# Patient Record
Sex: FEMALE | Race: WHITE | NOT HISPANIC OR LATINO
[De-identification: names, ages, dates, MRNs, and addresses within clinical notes are randomized per-mention and may not be internally consistent; named-entity substitution may affect disease eponyms.]

---

## 2019-02-28 ENCOUNTER — APPOINTMENT (OUTPATIENT)
Dept: OBGYN | Facility: CLINIC | Age: 69
End: 2019-02-28
Payer: MEDICARE

## 2019-02-28 VITALS
HEART RATE: 83 BPM | DIASTOLIC BLOOD PRESSURE: 86 MMHG | OXYGEN SATURATION: 98 % | BODY MASS INDEX: 38.89 KG/M2 | SYSTOLIC BLOOD PRESSURE: 134 MMHG | RESPIRATION RATE: 16 BRPM | HEIGHT: 66 IN | WEIGHT: 242 LBS

## 2019-02-28 DIAGNOSIS — N84.1 POLYP OF CERVIX UTERI: ICD-10-CM

## 2019-02-28 DIAGNOSIS — Z13.820 ENCOUNTER FOR SCREENING FOR OSTEOPOROSIS: ICD-10-CM

## 2019-02-28 DIAGNOSIS — Z12.31 ENCOUNTER FOR SCREENING MAMMOGRAM FOR MALIGNANT NEOPLASM OF BREAST: ICD-10-CM

## 2019-02-28 PROCEDURE — G0101: CPT

## 2019-02-28 NOTE — CHIEF COMPLAINT
[Annual Visit] : annual visit [FreeTextEntry1] : pap ,mammo ,dexa 2016\par colon 7 yrs ago\par daughter survivor 5 yrs lymphoma

## 2019-02-28 NOTE — PHYSICAL EXAM
[Awake] : awake [Alert] : alert [Acute Distress] : no acute distress [Mass] : no breast mass [Nipple Discharge] : no nipple discharge [Axillary LAD] : no axillary lymphadenopathy [Soft] : soft [Tender] : non tender [Oriented x3] : oriented to person, place, and time [Normal] : cervix [No Bleeding] : there was no active vaginal bleeding [Absent] : absent [Adnexa Absent] : absent bilaterally

## 2019-03-01 LAB — HPV HIGH+LOW RISK DNA PNL CVX: NOT DETECTED

## 2019-03-04 LAB — CYTOLOGY CVX/VAG DOC THIN PREP: NORMAL

## 2019-04-14 ENCOUNTER — FORM ENCOUNTER (OUTPATIENT)
Age: 69
End: 2019-04-14

## 2019-04-15 ENCOUNTER — APPOINTMENT (OUTPATIENT)
Dept: MAMMOGRAPHY | Facility: IMAGING CENTER | Age: 69
End: 2019-04-15
Payer: MEDICARE

## 2019-04-15 ENCOUNTER — OUTPATIENT (OUTPATIENT)
Dept: OUTPATIENT SERVICES | Facility: HOSPITAL | Age: 69
LOS: 1 days | End: 2019-04-15
Payer: MEDICARE

## 2019-04-15 ENCOUNTER — APPOINTMENT (OUTPATIENT)
Dept: RADIOLOGY | Facility: IMAGING CENTER | Age: 69
End: 2019-04-15
Payer: MEDICARE

## 2019-04-15 ENCOUNTER — APPOINTMENT (OUTPATIENT)
Dept: ULTRASOUND IMAGING | Facility: IMAGING CENTER | Age: 69
End: 2019-04-15
Payer: MEDICARE

## 2019-04-15 DIAGNOSIS — Z13.820 ENCOUNTER FOR SCREENING FOR OSTEOPOROSIS: ICD-10-CM

## 2019-04-15 DIAGNOSIS — Z12.31 ENCOUNTER FOR SCREENING MAMMOGRAM FOR MALIGNANT NEOPLASM OF BREAST: ICD-10-CM

## 2019-04-15 DIAGNOSIS — R92.2 INCONCLUSIVE MAMMOGRAM: ICD-10-CM

## 2019-04-15 DIAGNOSIS — Z90.710 ACQUIRED ABSENCE OF BOTH CERVIX AND UTERUS: Chronic | ICD-10-CM

## 2019-04-15 PROCEDURE — 77067 SCR MAMMO BI INCL CAD: CPT | Mod: 26

## 2019-04-15 PROCEDURE — 77080 DXA BONE DENSITY AXIAL: CPT | Mod: 26

## 2019-04-15 PROCEDURE — 77063 BREAST TOMOSYNTHESIS BI: CPT | Mod: 26

## 2019-04-15 PROCEDURE — 77080 DXA BONE DENSITY AXIAL: CPT

## 2019-04-15 PROCEDURE — 76641 ULTRASOUND BREAST COMPLETE: CPT

## 2019-04-15 PROCEDURE — 77063 BREAST TOMOSYNTHESIS BI: CPT

## 2019-04-15 PROCEDURE — 76641 ULTRASOUND BREAST COMPLETE: CPT | Mod: 26,50

## 2019-04-15 PROCEDURE — 77067 SCR MAMMO BI INCL CAD: CPT

## 2019-04-16 ENCOUNTER — OTHER (OUTPATIENT)
Age: 69
End: 2019-04-16

## 2019-04-21 ENCOUNTER — FORM ENCOUNTER (OUTPATIENT)
Age: 69
End: 2019-04-21

## 2019-04-22 ENCOUNTER — APPOINTMENT (OUTPATIENT)
Dept: MAMMOGRAPHY | Facility: IMAGING CENTER | Age: 69
End: 2019-04-22
Payer: MEDICARE

## 2019-04-22 ENCOUNTER — APPOINTMENT (OUTPATIENT)
Dept: ULTRASOUND IMAGING | Facility: IMAGING CENTER | Age: 69
End: 2019-04-22
Payer: MEDICARE

## 2019-04-22 ENCOUNTER — OUTPATIENT (OUTPATIENT)
Dept: OUTPATIENT SERVICES | Facility: HOSPITAL | Age: 69
LOS: 1 days | End: 2019-04-22
Payer: MEDICARE

## 2019-04-22 DIAGNOSIS — R92.2 INCONCLUSIVE MAMMOGRAM: ICD-10-CM

## 2019-04-22 DIAGNOSIS — Z90.710 ACQUIRED ABSENCE OF BOTH CERVIX AND UTERUS: Chronic | ICD-10-CM

## 2019-04-22 PROCEDURE — G0279: CPT | Mod: 26

## 2019-04-22 PROCEDURE — 76641 ULTRASOUND BREAST COMPLETE: CPT | Mod: 26,RT

## 2019-04-22 PROCEDURE — 77065 DX MAMMO INCL CAD UNI: CPT | Mod: 26,RT

## 2019-04-22 PROCEDURE — 76641 ULTRASOUND BREAST COMPLETE: CPT

## 2019-04-22 PROCEDURE — G0279: CPT

## 2019-04-22 PROCEDURE — 77065 DX MAMMO INCL CAD UNI: CPT

## 2019-09-05 ENCOUNTER — APPOINTMENT (OUTPATIENT)
Dept: GASTROENTEROLOGY | Facility: CLINIC | Age: 69
End: 2019-09-05
Payer: MEDICARE

## 2019-09-05 VITALS — BODY MASS INDEX: 37.77 KG/M2 | WEIGHT: 235 LBS | HEIGHT: 66 IN

## 2019-09-05 VITALS
OXYGEN SATURATION: 95 % | DIASTOLIC BLOOD PRESSURE: 80 MMHG | HEART RATE: 69 BPM | SYSTOLIC BLOOD PRESSURE: 130 MMHG | TEMPERATURE: 98.4 F

## 2019-09-05 DIAGNOSIS — Z87.42 PERSONAL HISTORY OF OTHER DISEASES OF THE FEMALE GENITAL TRACT: ICD-10-CM

## 2019-09-05 DIAGNOSIS — Z86.39 PERSONAL HISTORY OF OTHER ENDOCRINE, NUTRITIONAL AND METABOLIC DISEASE: ICD-10-CM

## 2019-09-05 DIAGNOSIS — Z78.9 OTHER SPECIFIED HEALTH STATUS: ICD-10-CM

## 2019-09-05 DIAGNOSIS — Z80.7 FAMILY HISTORY OF OTHER MALIGNANT NEOPLASMS OF LYMPHOID, HEMATOPOIETIC AND RELATED TISSUES: ICD-10-CM

## 2019-09-05 DIAGNOSIS — Z12.11 ENCOUNTER FOR SCREENING FOR MALIGNANT NEOPLASM OF COLON: ICD-10-CM

## 2019-09-05 DIAGNOSIS — Z80.1 FAMILY HISTORY OF MALIGNANT NEOPLASM OF TRACHEA, BRONCHUS AND LUNG: ICD-10-CM

## 2019-09-05 DIAGNOSIS — K59.00 CONSTIPATION, UNSPECIFIED: ICD-10-CM

## 2019-09-05 DIAGNOSIS — R19.4 CHANGE IN BOWEL HABIT: ICD-10-CM

## 2019-09-05 PROCEDURE — 99203 OFFICE O/P NEW LOW 30 MIN: CPT

## 2019-09-05 RX ORDER — FLUVASTATIN SODIUM 80 MG/1
80 TABLET, EXTENDED RELEASE ORAL
Refills: 0 | Status: ACTIVE | COMMUNITY

## 2019-09-08 PROBLEM — Z87.42 HISTORY OF ENDOMETRIOSIS: Status: RESOLVED | Noted: 2019-09-08 | Resolved: 2019-09-08

## 2019-09-08 NOTE — REASON FOR VISIT
[Initial Evaluation] : an initial evaluation [FreeTextEntry1] : Change in bowel habits, constipation, screening colonoscopy

## 2019-09-08 NOTE — ASSESSMENT
[FreeTextEntry1] : Patient with a recent change in her bowel habits with constipation.  Her last colonoscopy was over 10 years ago.\par \par A colonoscopy has been scheduled. The risks, benefits, alternatives, and limitations of the procedure, including the possibility of missed lesions, were explained.

## 2019-09-08 NOTE — HISTORY OF PRESENT ILLNESS
[FreeTextEntry1] : The patient is a 69-year-old woman who reports recent onset of constipation over the past 2 months.  She has to force/strain to move her bowels.  Prior to this, she was going once a day but now she skips days.  She denies melena or bright red blood per rectum.  She has mild right-sided abdominal pain that occurs while she is straining but then this resolves.  She denies heartburn or dysphasia.  She has lost 10 pounds over the past 6 months intentionally.  The patient has not been hospitalized in the past year and denies any cardiac issues.  The patient reports her last colonoscopy was over 10 years ago.

## 2019-09-08 NOTE — PHYSICAL EXAM
[General Appearance - Alert] : alert [General Appearance - In No Acute Distress] : in no acute distress [Neck Appearance] : the appearance of the neck was normal [Neck Cervical Mass (___cm)] : no neck mass was observed [Jugular Venous Distention Increased] : there was no jugular-venous distention [Thyroid Diffuse Enlargement] : the thyroid was not enlarged [Thyroid Nodule] : there were no palpable thyroid nodules [Auscultation Breath Sounds / Voice Sounds] : lungs were clear to auscultation bilaterally [Heart Rate And Rhythm] : heart rate was normal and rhythm regular [Heart Sounds] : normal S1 and S2 [Heart Sounds Gallop] : no gallops [Murmurs] : no murmurs [Heart Sounds Pericardial Friction Rub] : no pericardial rub [Edema] : there was no peripheral edema [Abdomen Soft] : soft [Bowel Sounds] : normal bowel sounds [Abdomen Tenderness] : non-tender [] : no hepato-splenomegaly [Abdomen Mass (___ Cm)] : no abdominal mass palpated [No CVA Tenderness] : no ~M costovertebral angle tenderness [No Spinal Tenderness] : no spinal tenderness [Impaired Insight] : insight and judgment were intact [Oriented To Time, Place, And Person] : oriented to person, place, and time [Affect] : the affect was normal [FreeTextEntry1] : obese

## 2019-10-04 ENCOUNTER — APPOINTMENT (OUTPATIENT)
Dept: GASTROENTEROLOGY | Facility: AMBULATORY MEDICAL SERVICES | Age: 69
End: 2019-10-04
Payer: MEDICARE

## 2019-10-04 PROCEDURE — 45385 COLONOSCOPY W/LESION REMOVAL: CPT

## 2019-10-28 ENCOUNTER — APPOINTMENT (OUTPATIENT)
Dept: GASTROENTEROLOGY | Facility: CLINIC | Age: 69
End: 2019-10-28
Payer: MEDICARE

## 2019-10-28 VITALS
HEIGHT: 66 IN | DIASTOLIC BLOOD PRESSURE: 80 MMHG | TEMPERATURE: 98.5 F | BODY MASS INDEX: 37.45 KG/M2 | SYSTOLIC BLOOD PRESSURE: 120 MMHG | OXYGEN SATURATION: 98 % | WEIGHT: 233 LBS | HEART RATE: 66 BPM

## 2019-10-28 DIAGNOSIS — K64.4 RESIDUAL HEMORRHOIDAL SKIN TAGS: ICD-10-CM

## 2019-10-28 DIAGNOSIS — K64.8 OTHER HEMORRHOIDS: ICD-10-CM

## 2019-10-28 PROCEDURE — 99213 OFFICE O/P EST LOW 20 MIN: CPT

## 2019-10-29 PROBLEM — K64.4 EXTERNAL HEMORRHOID: Status: ACTIVE | Noted: 2019-10-29

## 2019-10-29 PROBLEM — K64.8 HEMORRHOIDS, INTERNAL: Status: ACTIVE | Noted: 2019-10-29

## 2019-10-29 NOTE — CONSULT LETTER
[FreeTextEntry1] : Dear Dr. Vincent Heredia,\par \par I had the pleasure of seeing your patient SHAWN MADRIGAL in the office today.  My office note is attached.\par \par Thank you very much for allowing me to participate in the care of your patient.\par \par Sincerely,\par \par Dmitriy Mccann M.D., FACG, FACP\par Director, Celiac Program at [facility]\par  of Medicine\par [employer] at [facility] Practice Director,\par [facility] Gastroenterology/Internal Medicine at [place]\[facility] [address]\[place] [address]\par Tel: [phone]\par Email: [email or web address]\par \par \par The attached note has been created using a voice recognition system (Dragon).  There may be some misspellings and typos.  Please call my office if you have any issues or questions.

## 2019-10-29 NOTE — HISTORY OF PRESENT ILLNESS
[FreeTextEntry1] : The patient is status post colonoscopy performed on October 4, 2019.  The examination was significant for removal of a tubular adenoma from the mid transverse colon as well as mild sigmoid diverticulosis.  Patient also has internal and external hemorrhoids which are mostly asymptomatic and less she strains.  She does note some straining and moves her bowels every 2 to 3 days.  The patient is otherwise well.\par \par \par Note from 9/5/2019 - The patient is a 69-year-old woman who reports recent onset of constipation over the past 2 months.  She has to force/strain to move her bowels.  Prior to this, she was going once a day but now she skips days.  She denies melena or bright red blood per rectum.  She has mild right-sided abdominal pain that occurs while she is straining but then this resolves.  She denies heartburn or dysphasia.  She has lost 10 pounds over the past 6 months intentionally.  The patient has not been hospitalized in the past year and denies any cardiac issues.  The patient reports her last colonoscopy was over 10 years ago.

## 2019-10-29 NOTE — ASSESSMENT
[FreeTextEntry1] : Patient found to have an adenomatous polyp and diverticulosis.  She has some straining with her bowel movements.\par \par Information was given to the patient regarding diverticulosis and a high-fiber diet.\par \par Patient was advised to use Citrucel powder daily as a fiber supplement.\par \par She was also advised to increase water intake.\par \par We will repeat a colonoscopy in 3 years that is October 2022.\par \par \par Plan from 9/5/2019 - Patient with a recent change in her bowel habits with constipation.  Her last colonoscopy was over 10 years ago.\par \par A colonoscopy has been scheduled. The risks, benefits, alternatives, and limitations of the procedure, including the possibility of missed lesions, were explained.

## 2020-02-27 ENCOUNTER — APPOINTMENT (OUTPATIENT)
Dept: UROLOGY | Facility: CLINIC | Age: 70
End: 2020-02-27
Payer: MEDICARE

## 2020-02-27 VITALS
TEMPERATURE: 98.2 F | OXYGEN SATURATION: 97 % | BODY MASS INDEX: 37.77 KG/M2 | SYSTOLIC BLOOD PRESSURE: 154 MMHG | HEIGHT: 66 IN | RESPIRATION RATE: 16 BRPM | WEIGHT: 235 LBS | DIASTOLIC BLOOD PRESSURE: 67 MMHG | HEART RATE: 65 BPM

## 2020-02-27 DIAGNOSIS — N39.46 MIXED INCONTINENCE: ICD-10-CM

## 2020-02-27 DIAGNOSIS — R39.15 URGENCY OF URINATION: ICD-10-CM

## 2020-02-27 PROCEDURE — 99204 OFFICE O/P NEW MOD 45 MIN: CPT

## 2020-02-27 NOTE — REVIEW OF SYSTEMS
[Wake up at night to urinate  How many times?  ___] : wakes up to urinate [unfilled] times during the night [see HPI] : see HPI [Strong urge to urinate] : strong urge to urinate [Bladder fullness after urinating] : bladder fullness after urinating [Bladder pressure] : experiences bladder pressure [Leakage of urine with urgency] : leakage of urine with urgency [Leakage of urine with straining, coughing, laughing] : leakage of urine with straining, coughing, laughing [Unaware of when urine is leaking] : unaware of when urine is leaking [Negative] : Heme/Lymph

## 2020-02-27 NOTE — PHYSICAL EXAM
[General Appearance - Well Nourished] : well nourished [General Appearance - Well Developed] : well developed [Edema] : no peripheral edema [General Appearance - In No Acute Distress] : no acute distress [Abdomen Soft] : soft [Exaggerated Use Of Accessory Muscles For Inspiration] : no accessory muscle use [Abdomen Tenderness] : non-tender [Costovertebral Angle Tenderness] : no ~M costovertebral angle tenderness [Urethral Meatus] : normal urethra [Urinary Bladder Findings] : the bladder was normal on palpation [External Female Genitalia] : normal external genitalia [Vagina] : normal vaginal exam [FreeTextEntry1] : mild cystocele, no demonstrable stress leakage, mild-mod atrophy [Normal Station and Gait] : the gait and station were normal for the patient's age [Skin Color & Pigmentation] : normal skin color and pigmentation [No Focal Deficits] : no focal deficits [Oriented To Time, Place, And Person] : oriented to person, place, and time [Cervical Lymph Nodes Enlarged Anterior Bilaterally] : anterior cervical [Supraclavicular Lymph Nodes Enlarged Bilaterally] : supraclavicular

## 2020-02-27 NOTE — ASSESSMENT
[FreeTextEntry1] : Minimal lifestyle factors. Mixed incontinence with UUI>COURTNEY. \par --UA, UCx\par --oxybutynin qhs\par --Refer to PT\par --RTC in 2-3mo

## 2020-02-27 NOTE — HISTORY OF PRESENT ILLNESS
[FreeTextEntry1] : 70 yo female with cc of urinary frequency and urge incontinence. She notes increased bother over many months as urge and control has gotten worse. SHe gets up 2-3 times at night to go. She goes frequently during the day as well. She is going 5+ times per day. Normal voided volumes. Endorses urgency and urge incontinence. Wears a thin pad 5-6 per day (most at night as she has to change at night). leakage is primarily urge leakage. Endorses some stress leakage during the day. No straining or feelings of incomplete emptying.  \par \par Drinks 1 can of soda and otherwise water. No issues with constipation. Curbs fluids for 2h prior to bed. Issues with UTIs many years ago. No sensation of prolapse. Has hx of hysterectomy for endometriosis (thought to be fibroids at the time).

## 2020-03-03 LAB
APPEARANCE: CLEAR
BACTERIA UR CULT: NORMAL
BACTERIA: NEGATIVE
BILIRUBIN URINE: NEGATIVE
BLOOD URINE: NORMAL
COLOR: NORMAL
GLUCOSE QUALITATIVE U: NEGATIVE
HYALINE CASTS: 0 /LPF
KETONES URINE: NEGATIVE
LEUKOCYTE ESTERASE URINE: NEGATIVE
MICROSCOPIC-UA: NORMAL
NITRITE URINE: NEGATIVE
PH URINE: 7.5
PROTEIN URINE: NEGATIVE
RED BLOOD CELLS URINE: 1 /HPF
SPECIFIC GRAVITY URINE: 1.01
SQUAMOUS EPITHELIAL CELLS: 2 /HPF
UROBILINOGEN URINE: NORMAL
WHITE BLOOD CELLS URINE: 1 /HPF

## 2020-11-18 ENCOUNTER — OUTPATIENT (OUTPATIENT)
Dept: OUTPATIENT SERVICES | Facility: HOSPITAL | Age: 70
LOS: 1 days | End: 2020-11-18
Payer: MEDICARE

## 2020-11-18 ENCOUNTER — RESULT REVIEW (OUTPATIENT)
Age: 70
End: 2020-11-18

## 2020-11-18 ENCOUNTER — APPOINTMENT (OUTPATIENT)
Dept: ULTRASOUND IMAGING | Facility: IMAGING CENTER | Age: 70
End: 2020-11-18
Payer: MEDICARE

## 2020-11-18 ENCOUNTER — APPOINTMENT (OUTPATIENT)
Dept: MAMMOGRAPHY | Facility: IMAGING CENTER | Age: 70
End: 2020-11-18
Payer: MEDICARE

## 2020-11-18 DIAGNOSIS — Z00.00 ENCOUNTER FOR GENERAL ADULT MEDICAL EXAMINATION WITHOUT ABNORMAL FINDINGS: ICD-10-CM

## 2020-11-18 DIAGNOSIS — Z90.710 ACQUIRED ABSENCE OF BOTH CERVIX AND UTERUS: Chronic | ICD-10-CM

## 2020-11-18 DIAGNOSIS — R92.2 INCONCLUSIVE MAMMOGRAM: ICD-10-CM

## 2020-11-18 PROCEDURE — 77063 BREAST TOMOSYNTHESIS BI: CPT | Mod: 26

## 2020-11-18 PROCEDURE — 77067 SCR MAMMO BI INCL CAD: CPT

## 2020-11-18 PROCEDURE — 76641 ULTRASOUND BREAST COMPLETE: CPT | Mod: 26,50

## 2020-11-18 PROCEDURE — 77067 SCR MAMMO BI INCL CAD: CPT | Mod: 26

## 2020-11-18 PROCEDURE — 77063 BREAST TOMOSYNTHESIS BI: CPT

## 2020-11-18 PROCEDURE — 76641 ULTRASOUND BREAST COMPLETE: CPT

## 2020-12-21 PROBLEM — Z12.11 ENCOUNTER FOR SCREENING FOR MALIGNANT NEOPLASM OF COLON: Status: RESOLVED | Noted: 2019-09-05 | Resolved: 2020-12-21

## 2021-01-25 ENCOUNTER — TRANSCRIPTION ENCOUNTER (OUTPATIENT)
Age: 71
End: 2021-01-25

## 2021-01-25 ENCOUNTER — NON-APPOINTMENT (OUTPATIENT)
Age: 71
End: 2021-01-25

## 2021-01-26 ENCOUNTER — APPOINTMENT (OUTPATIENT)
Dept: GASTROENTEROLOGY | Facility: CLINIC | Age: 71
End: 2021-01-26
Payer: MEDICARE

## 2021-01-26 VITALS
TEMPERATURE: 97.8 F | BODY MASS INDEX: 36.8 KG/M2 | DIASTOLIC BLOOD PRESSURE: 80 MMHG | HEART RATE: 82 BPM | WEIGHT: 229 LBS | HEIGHT: 66 IN | SYSTOLIC BLOOD PRESSURE: 130 MMHG | OXYGEN SATURATION: 99 %

## 2021-01-26 DIAGNOSIS — K57.30 DIVERTICULOSIS OF LARGE INTESTINE W/OUT PERFORATION OR ABSCESS W/OUT BLEEDING: ICD-10-CM

## 2021-01-26 DIAGNOSIS — R10.32 LEFT LOWER QUADRANT PAIN: ICD-10-CM

## 2021-01-26 DIAGNOSIS — D12.6 BENIGN NEOPLASM OF COLON, UNSPECIFIED: ICD-10-CM

## 2021-01-26 PROCEDURE — 99213 OFFICE O/P EST LOW 20 MIN: CPT

## 2021-01-26 NOTE — HISTORY OF PRESENT ILLNESS
[FreeTextEntry1] : The patient has a history of adenomatous colonic polyps and sigmoid diverticulosis.  3 to 4 weeks ago, the patient had lower abdominal cramps with diarrhea which lasted 1 day and then resolved.  2 weeks later, the patient had a similar day with diarrhea for 1 day.  However the patient has had ongoing left lower quadrant pain/throbbing which persists to this day.  She now has 1 bowel movement a day which is solid.  She denies fever, melena, bright red blood per rectum.  She denies nausea, vomiting, heartburn, dysphagia.  She does have increased belching.  The patient has not been admitted to the hospital in the past year and denies any cardiac issues.\par \par \par Note from 10/28/2019 - The patient is status post colonoscopy performed on October 4, 2019.  The examination was significant for removal of a tubular adenoma from the mid transverse colon as well as mild sigmoid diverticulosis.  Patient also has internal and external hemorrhoids which are mostly asymptomatic and less she strains.  She does note some straining and moves her bowels every 2 to 3 days.  The patient is otherwise well.\par \par \par Note from 9/5/2019 - The patient is a 69-year-old woman who reports recent onset of constipation over the past 2 months.  She has to force/strain to move her bowels.  Prior to this, she was going once a day but now she skips days.  She denies melena or bright red blood per rectum.  She has mild right-sided abdominal pain that occurs while she is straining but then this resolves.  She denies heartburn or dysphasia.  She has lost 10 pounds over the past 6 months intentionally.  The patient has not been hospitalized in the past year and denies any cardiac issues.  The patient reports her last colonoscopy was over 10 years ago.

## 2021-01-26 NOTE — CONSULT LETTER
[FreeTextEntry1] : Dear Dr. Vincent Heredia,\[facility] \[facility] I had the pleasure of seeing your patient SHAWN MADRIGAL in the office today.  My office note is attached. PLEASE READ THE "ASSESSMENT" SECTION OF THE NOTE TO SEE MY IMPRESSION AND PLAN.\par \par Thank you very much for allowing me to participate in the care of your patient.\par \[facility] Sincerely,\[facility] \[facility] Dmitriy Mccann M.D., FAC, [facility] Director, Celiac Program at [facility]  of Medicine\[facility] [employer] at hospitals/[facility] Practice Director,\[facility] - Gastroenterology/Internal Medicine at [address]\[place] [address]\[facility] Tel: [phone]\[facility] Email: [email or web address] \[facility] \[facility] The attached note has been created using a voice recognition system (Dragon).  There may be some misspellings and typos.  Please call my office if you have any issues or questions.

## 2021-01-26 NOTE — ASSESSMENT
[FreeTextEntry1] : Patient with left lower quadrant pain and throbbing with mild tenderness on exam.  She has known sigmoid diverticulosis and a history of adenomatous colonic polyps.  This may represent mild diverticulitis.\par \par The patient was placed on amoxicillin/clavulanate 875 mg twice daily for 10 days.  She was also counseled to stay on a low residue diet over this period of time.\par \par Patient was advised to call me immediately if her symptoms worsen.  Otherwise she will see me again in 2 to 2-1/2 weeks to assess for resolution of her symptoms.\par \par Patient is due for her next colonoscopy in October 2022.\par \par \par Plan from 10/28/2019 - Patient found to have an adenomatous polyp and diverticulosis.  She has some straining with her bowel movements.\par \par Information was given to the patient regarding diverticulosis and a high-fiber diet.\par \par Patient was advised to use Citrucel powder daily as a fiber supplement.\par \par She was also advised to increase water intake.\par \par We will repeat a colonoscopy in 3 years that is October 2022.\par \par \par Plan from 9/5/2019 - Patient with a recent change in her bowel habits with constipation.  Her last colonoscopy was over 10 years ago.\par \par A colonoscopy has been scheduled. The risks, benefits, alternatives, and limitations of the procedure, including the possibility of missed lesions, were explained.

## 2021-01-26 NOTE — PHYSICAL EXAM
[General Appearance - Alert] : alert [General Appearance - In No Acute Distress] : in no acute distress [Neck Appearance] : the appearance of the neck was normal [Neck Cervical Mass (___cm)] : no neck mass was observed [Jugular Venous Distention Increased] : there was no jugular-venous distention [Thyroid Diffuse Enlargement] : the thyroid was not enlarged [Thyroid Nodule] : there were no palpable thyroid nodules [Auscultation Breath Sounds / Voice Sounds] : lungs were clear to auscultation bilaterally [Heart Rate And Rhythm] : heart rate was normal and rhythm regular [Heart Sounds] : normal S1 and S2 [Murmurs] : no murmurs [Heart Sounds Gallop] : no gallops [Heart Sounds Pericardial Friction Rub] : no pericardial rub [Edema] : there was no peripheral edema [Bowel Sounds] : normal bowel sounds [Abdomen Soft] : soft [] : no hepato-splenomegaly [Abdomen Tenderness] : non-tender [Abdomen Mass (___ Cm)] : no abdominal mass palpated [No CVA Tenderness] : no ~M costovertebral angle tenderness [No Spinal Tenderness] : no spinal tenderness [Oriented To Time, Place, And Person] : oriented to person, place, and time [Impaired Insight] : insight and judgment were intact [Affect] : the affect was normal [FreeTextEntry1] : obese, mild LLQ tenderness, no rebound/guarding

## 2021-02-22 ENCOUNTER — APPOINTMENT (OUTPATIENT)
Dept: GASTROENTEROLOGY | Facility: CLINIC | Age: 71
End: 2021-02-22
Payer: MEDICARE

## 2021-02-22 VITALS
HEIGHT: 66 IN | WEIGHT: 230 LBS | TEMPERATURE: 97.8 F | BODY MASS INDEX: 36.96 KG/M2 | DIASTOLIC BLOOD PRESSURE: 78 MMHG | SYSTOLIC BLOOD PRESSURE: 138 MMHG

## 2021-02-22 DIAGNOSIS — K57.32 DIVERTICULITIS OF LARGE INTESTINE W/OUT PERFORATION OR ABSCESS W/OUT BLEEDING: ICD-10-CM

## 2021-02-22 PROCEDURE — 99213 OFFICE O/P EST LOW 20 MIN: CPT

## 2021-02-22 NOTE — HISTORY OF PRESENT ILLNESS
[FreeTextEntry1] : The patient is status post treatment for presumed diverticulitis with Augmentin 875 mg twice daily for 10 days.  She completed treatment on February 9.  Her abdominal pain is completely resolved.  She is moving her bowels every 1 to 2 days without melena or bright red blood per rectum.  The patient has a history of adenomatous colonic polyps.\par \par \par Note from 1/26/2021 - The patient has a history of adenomatous colonic polyps and sigmoid diverticulosis.  3 to 4 weeks ago, the patient had lower abdominal cramps with diarrhea which lasted 1 day and then resolved.  2 weeks later, the patient had a similar day with diarrhea for 1 day.  However the patient has had ongoing left lower quadrant pain/throbbing which persists to this day.  She now has 1 bowel movement a day which is solid.  She denies fever, melena, bright red blood per rectum.  She denies nausea, vomiting, heartburn, dysphagia.  She does have increased belching.  The patient has not been admitted to the hospital in the past year and denies any cardiac issues.\par \par \par Note from 10/28/2019 - The patient is status post colonoscopy performed on October 4, 2019.  The examination was significant for removal of a tubular adenoma from the mid transverse colon as well as mild sigmoid diverticulosis.  Patient also has internal and external hemorrhoids which are mostly asymptomatic and less she strains.  She does note some straining and moves her bowels every 2 to 3 days.  The patient is otherwise well.\par \par \par Note from 9/5/2019 - The patient is a 69-year-old woman who reports recent onset of constipation over the past 2 months.  She has to force/strain to move her bowels.  Prior to this, she was going once a day but now she skips days.  She denies melena or bright red blood per rectum.  She has mild right-sided abdominal pain that occurs while she is straining but then this resolves.  She denies heartburn or dysphasia.  She has lost 10 pounds over the past 6 months intentionally.  The patient has not been hospitalized in the past year and denies any cardiac issues.  The patient reports her last colonoscopy was over 10 years ago.

## 2021-02-22 NOTE — ASSESSMENT
[FreeTextEntry1] : Patient doing well after treatment for presumed diverticulitis.  She has resolution of abdominal pain and tenderness.  She has a history of adenomatous colonic polyps.\par \par Patient was advised to resume a high-fiber diet.\par \par Patient is due for her next colonoscopy in October 2022.\par \par \par Plan from 1/26/2021 - Patient with left lower quadrant pain and throbbing with mild tenderness on exam.  She has known sigmoid diverticulosis and a history of adenomatous colonic polyps.  This may represent mild diverticulitis.\par \par The patient was placed on amoxicillin/clavulanate 875 mg twice daily for 10 days.  She was also counseled to stay on a low residue diet over this period of time.\par \par Patient was advised to call me immediately if her symptoms worsen.  Otherwise she will see me again in 2 to 2-1/2 weeks to assess for resolution of her symptoms.\par \par Patient is due for her next colonoscopy in October 2022.\par \par \par Plan from 10/28/2019 - Patient found to have an adenomatous polyp and diverticulosis.  She has some straining with her bowel movements.\par \par Information was given to the patient regarding diverticulosis and a high-fiber diet.\par \par Patient was advised to use Citrucel powder daily as a fiber supplement.\par \par She was also advised to increase water intake.\par \par We will repeat a colonoscopy in 3 years that is October 2022.\par \par \par Plan from 9/5/2019 - Patient with a recent change in her bowel habits with constipation.  Her last colonoscopy was over 10 years ago.\par \par A colonoscopy has been scheduled. The risks, benefits, alternatives, and limitations of the procedure, including the possibility of missed lesions, were explained.

## 2021-02-22 NOTE — CONSULT LETTER
[FreeTextEntry1] : Dear Dr. Vincent Heredia,\[facility] \[facility] I had the pleasure of seeing your patient SHAWN MADRIGAL in the office today.  My office note is attached. PLEASE READ THE "ASSESSMENT" SECTION OF THE NOTE TO SEE MY IMPRESSION AND PLAN.\par \par Thank you very much for allowing me to participate in the care of your patient.\par \[facility] Sincerely,\[facility] \[facility] Dmitriy Mccann M.D., FAC, [facility] Director, Celiac Program at [facility]  of Medicine\[facility] [employer] at [facility] Practice Director,\[facility] - Gastroenterology/Internal Medicine at [address]\[place] [address]\[facility] Tel: [phone]\[facility] Email: [email or web address] \[facility] \[facility] The attached note has been created using a voice recognition system (Dragon).  There may be some misspellings and typos.  Please call my office if you have any issues or questions.

## 2021-03-04 ENCOUNTER — APPOINTMENT (OUTPATIENT)
Dept: OBGYN | Facility: CLINIC | Age: 71
End: 2021-03-04
Payer: MEDICARE

## 2021-03-04 VITALS
DIASTOLIC BLOOD PRESSURE: 80 MMHG | SYSTOLIC BLOOD PRESSURE: 128 MMHG | BODY MASS INDEX: 36.64 KG/M2 | OXYGEN SATURATION: 99 % | HEART RATE: 98 BPM | HEIGHT: 66 IN | WEIGHT: 228 LBS | TEMPERATURE: 98 F

## 2021-03-04 PROCEDURE — G0101: CPT

## 2021-03-04 RX ORDER — SODIUM SULFATE, POTASSIUM SULFATE, MAGNESIUM SULFATE 17.5; 3.13; 1.6 G/ML; G/ML; G/ML
17.5-3.13-1.6 SOLUTION, CONCENTRATE ORAL
Qty: 1 | Refills: 0 | Status: DISCONTINUED | COMMUNITY
Start: 2019-09-05 | End: 2021-03-04

## 2021-03-04 RX ORDER — OXYBUTYNIN CHLORIDE 5 MG/1
5 TABLET ORAL
Qty: 30 | Refills: 11 | Status: DISCONTINUED | COMMUNITY
Start: 2020-02-27 | End: 2021-03-04

## 2021-03-05 LAB — HPV HIGH+LOW RISK DNA PNL CVX: NOT DETECTED

## 2021-03-08 LAB — CYTOLOGY CVX/VAG DOC THIN PREP: ABNORMAL

## 2021-12-13 ENCOUNTER — APPOINTMENT (OUTPATIENT)
Dept: ORTHOPEDIC SURGERY | Facility: CLINIC | Age: 71
End: 2021-12-13
Payer: MEDICARE

## 2021-12-13 ENCOUNTER — NON-APPOINTMENT (OUTPATIENT)
Age: 71
End: 2021-12-13

## 2021-12-13 VITALS
HEART RATE: 67 BPM | HEIGHT: 63.5 IN | DIASTOLIC BLOOD PRESSURE: 86 MMHG | BODY MASS INDEX: 39.2 KG/M2 | WEIGHT: 224 LBS | SYSTOLIC BLOOD PRESSURE: 153 MMHG

## 2021-12-13 DIAGNOSIS — M25.562 PAIN IN LEFT KNEE: ICD-10-CM

## 2021-12-13 DIAGNOSIS — S80.02XA CONTUSION OF LEFT KNEE, INITIAL ENCOUNTER: ICD-10-CM

## 2021-12-13 DIAGNOSIS — M17.12 UNILATERAL PRIMARY OSTEOARTHRITIS, LEFT KNEE: ICD-10-CM

## 2021-12-13 PROCEDURE — 99203 OFFICE O/P NEW LOW 30 MIN: CPT

## 2021-12-13 PROCEDURE — 73564 X-RAY EXAM KNEE 4 OR MORE: CPT | Mod: LT

## 2022-10-05 ENCOUNTER — OUTPATIENT (OUTPATIENT)
Dept: OUTPATIENT SERVICES | Facility: HOSPITAL | Age: 72
LOS: 1 days | End: 2022-10-05
Payer: MEDICARE

## 2022-10-05 ENCOUNTER — RESULT REVIEW (OUTPATIENT)
Age: 72
End: 2022-10-05

## 2022-10-05 ENCOUNTER — APPOINTMENT (OUTPATIENT)
Dept: ULTRASOUND IMAGING | Facility: IMAGING CENTER | Age: 72
End: 2022-10-05

## 2022-10-05 ENCOUNTER — APPOINTMENT (OUTPATIENT)
Dept: MAMMOGRAPHY | Facility: IMAGING CENTER | Age: 72
End: 2022-10-05

## 2022-10-05 DIAGNOSIS — Z12.39 ENCOUNTER FOR OTHER SCREENING FOR MALIGNANT NEOPLASM OF BREAST: ICD-10-CM

## 2022-10-05 DIAGNOSIS — Z90.710 ACQUIRED ABSENCE OF BOTH CERVIX AND UTERUS: Chronic | ICD-10-CM

## 2022-10-05 DIAGNOSIS — R92.2 INCONCLUSIVE MAMMOGRAM: ICD-10-CM

## 2022-10-05 PROCEDURE — 77067 SCR MAMMO BI INCL CAD: CPT | Mod: 26

## 2022-10-05 PROCEDURE — 76641 ULTRASOUND BREAST COMPLETE: CPT | Mod: 26,50

## 2022-10-05 PROCEDURE — 77067 SCR MAMMO BI INCL CAD: CPT

## 2022-10-05 PROCEDURE — 76641 ULTRASOUND BREAST COMPLETE: CPT

## 2022-10-05 PROCEDURE — 77063 BREAST TOMOSYNTHESIS BI: CPT

## 2022-10-05 PROCEDURE — 77063 BREAST TOMOSYNTHESIS BI: CPT | Mod: 26

## 2023-12-19 ENCOUNTER — APPOINTMENT (OUTPATIENT)
Dept: GASTROENTEROLOGY | Facility: CLINIC | Age: 73
End: 2023-12-19
Payer: MEDICARE

## 2023-12-19 VITALS
WEIGHT: 228 LBS | TEMPERATURE: 97.6 F | OXYGEN SATURATION: 98 % | BODY MASS INDEX: 36.64 KG/M2 | HEART RATE: 71 BPM | HEIGHT: 66 IN | DIASTOLIC BLOOD PRESSURE: 80 MMHG | SYSTOLIC BLOOD PRESSURE: 130 MMHG

## 2023-12-19 DIAGNOSIS — R19.5 OTHER FECAL ABNORMALITIES: ICD-10-CM

## 2023-12-19 DIAGNOSIS — Z86.010 PERSONAL HISTORY OF COLONIC POLYPS: ICD-10-CM

## 2023-12-19 PROCEDURE — 99213 OFFICE O/P EST LOW 20 MIN: CPT

## 2023-12-19 RX ORDER — GINSENG 100 MG
CAPSULE ORAL
Refills: 0 | Status: ACTIVE | COMMUNITY

## 2023-12-19 RX ORDER — MULTIVITAMIN
TABLET ORAL
Refills: 0 | Status: ACTIVE | COMMUNITY

## 2023-12-19 RX ORDER — AMOXICILLIN AND CLAVULANATE POTASSIUM 875; 125 MG/1; MG/1
875-125 TABLET, COATED ORAL
Qty: 20 | Refills: 0 | Status: DISCONTINUED | COMMUNITY
Start: 2021-01-26 | End: 2023-12-19

## 2023-12-19 NOTE — HISTORY OF PRESENT ILLNESS
[FreeTextEntry1] : The patient has a history of adenomatous colonic polyps and a history of diverticulitis.  The patient's last colonoscopy was in October 2019.  She feels well denying abdominal pain, heartburn, dysphagia, nausea, vomiting.  She generally has 1 solid bowel movement a day but has intermittent days with diarrhea/loose stools that generally last 1 day at a time and then resolves.  This was occurring once a week at 1 point but now has only occurred once in the past 3 weeks.  She denies melena or bright red blood per rectum.  She had lost 10 pounds intentionally but is now gained it back.  The patient has not been admitted to the hospital in the past year and denies any cardiac issues.   Note from 2/22/2021 - The patient is status post treatment for presumed diverticulitis with Augmentin 875 mg twice daily for 10 days.  She completed treatment on February 9.  Her abdominal pain is completely resolved.  She is moving her bowels every 1 to 2 days without melena or bright red blood per rectum.  The patient has a history of adenomatous colonic polyps.   Note from 1/26/2021 - The patient has a history of adenomatous colonic polyps and sigmoid diverticulosis.  3 to 4 weeks ago, the patient had lower abdominal cramps with diarrhea which lasted 1 day and then resolved.  2 weeks later, the patient had a similar day with diarrhea for 1 day.  However the patient has had ongoing left lower quadrant pain/throbbing which persists to this day.  She now has 1 bowel movement a day which is solid.  She denies fever, melena, bright red blood per rectum.  She denies nausea, vomiting, heartburn, dysphagia.  She does have increased belching.  The patient has not been admitted to the hospital in the past year and denies any cardiac issues.   Note from 10/28/2019 - The patient is status post colonoscopy performed on October 4, 2019.  The examination was significant for removal of a tubular adenoma from the mid transverse colon as well as mild sigmoid diverticulosis.  Patient also has internal and external hemorrhoids which are mostly asymptomatic and less she strains.  She does note some straining and moves her bowels every 2 to 3 days.  The patient is otherwise well.   Note from 9/5/2019 - The patient is a 69-year-old woman who reports recent onset of constipation over the past 2 months.  She has to force/strain to move her bowels.  Prior to this, she was going once a day but now she skips days.  She denies melena or bright red blood per rectum.  She has mild right-sided abdominal pain that occurs while she is straining but then this resolves.  She denies heartburn or dysphasia.  She has lost 10 pounds over the past 6 months intentionally.  The patient has not been hospitalized in the past year and denies any cardiac issues.  The patient reports her last colonoscopy was over 10 years ago.

## 2023-12-19 NOTE — CONSULT LETTER
[FreeTextEntry1] : Dear Dr. Vincent Heredia,  I had the pleasure of seeing your patient SHAWN MADRIGAL in the office today.  My office note is attached. PLEASE READ THE "ASSESSMENT" SECTION OF THE NOTE TO SEE MY IMPRESSION AND PLAN.  Thank you very much for allowing me to participate in the care of your patient.  Sincerely,  Dmitriy Mccann M.D., FACG, FACP Director, Celiac Program at [facility]  of Medicine, [facility] [employer] at [facility] Adjunct  of Medicine, [facility] of Medicine Practice Director, [facility] - Gastroenterology at [address] - [address] [place] [address] Tel: [phone] Email: [email or web address]   The attached note has been created using a voice recognition system ([email or web address]).  There may be some misspellings and typos.  Please call my office if you have any issues or questions.

## 2023-12-19 NOTE — ASSESSMENT
[FreeTextEntry1] : Patient with a history of adenomatous colonic polyps and a history of diverticulitis.  She gets intermittent loose stools.  A colonoscopy has been scheduled. The risks, benefits, alternatives, and limitations of the procedure, including the possibility of missed lesions, were explained.   Plan from 2/22/2021 - Patient doing well after treatment for presumed diverticulitis. She has resolution of abdominal pain and tenderness. She has a history of adenomatous colonic polyps.    Patient was advised to resume a high-fiber diet.    Patient is due for her next colonoscopy in October 2022.      Plan from 1/26/2021 - Patient with left lower quadrant pain and throbbing with mild tenderness on exam. She has known sigmoid diverticulosis and a history of adenomatous colonic polyps. This may represent mild diverticulitis.    The patient was placed on amoxicillin/clavulanate 875 mg twice daily for 10 days. She was also counseled to stay on a low residue diet over this period of time.    Patient was advised to call me immediately if her symptoms worsen. Otherwise she will see me again in 2 to 2-1/2 weeks to assess for resolution of her symptoms.    Patient is due for her next colonoscopy in October 2022.      Plan from 10/28/2019 - Patient found to have an adenomatous polyp and diverticulosis. She has some straining with her bowel movements.    Information was given to the patient regarding diverticulosis and a high-fiber diet.    Patient was advised to use Citrucel powder daily as a fiber supplement.    She was also advised to increase water intake.    We will repeat a colonoscopy in 3 years that is October 2022.      Plan from 9/5/2019 - Patient with a recent change in her bowel habits with constipation. Her last colonoscopy was over 10 years ago.    A colonoscopy has been scheduled. The risks, benefits, alternatives, and limitations of the procedure, including the possibility of missed lesions, were explained.

## 2023-12-22 ENCOUNTER — APPOINTMENT (OUTPATIENT)
Dept: OBGYN | Facility: CLINIC | Age: 73
End: 2023-12-22
Payer: MEDICARE

## 2023-12-22 VITALS
DIASTOLIC BLOOD PRESSURE: 83 MMHG | HEIGHT: 66 IN | SYSTOLIC BLOOD PRESSURE: 150 MMHG | BODY MASS INDEX: 36.96 KG/M2 | HEART RATE: 79 BPM | WEIGHT: 230 LBS

## 2023-12-22 DIAGNOSIS — Z01.419 ENCOUNTER FOR GYNECOLOGICAL EXAMINATION (GENERAL) (ROUTINE) W/OUT ABNORMAL FINDINGS: ICD-10-CM

## 2023-12-22 PROCEDURE — G0101: CPT

## 2023-12-22 RX ORDER — SODIUM PICOSULFATE, MAGNESIUM OXIDE, AND ANHYDROUS CITRIC ACID 12; 3.5; 1 G/175ML; G/175ML; MG/175ML
10-3.5-12 MG-GM LIQUID ORAL
Qty: 2 | Refills: 0 | Status: DISCONTINUED | COMMUNITY
Start: 2023-12-19 | End: 2023-12-22

## 2023-12-23 NOTE — PHYSICAL EXAM
[Chaperone Present] : A chaperone was present in the examining room during all aspects of the physical examination [FreeTextEntry1] : CARLEEN Ordonez [Appropriately responsive] : appropriately responsive [Alert] : alert [No Lymphadenopathy] : no lymphadenopathy [No Acute Distress] : no acute distress [Regular Rate Rhythm] : regular rate rhythm [No Murmurs] : no murmurs [Clear to Auscultation B/L] : clear to auscultation bilaterally [Soft] : soft [Non-tender] : non-tender [Non-distended] : non-distended [No HSM] : No HSM [No Lesions] : no lesions [No Mass] : no mass [Oriented x3] : oriented x3 [Examination Of The Breasts] : a normal appearance [No Masses] : no breast masses were palpable [Labia Majora] : normal [Labia Minora] : normal [Normal] : normal [Absent] : absent [Uterine Adnexae] : normal

## 2023-12-23 NOTE — REVIEW OF SYSTEMS
[Patient Intake Form Reviewed] : Patient intake form was reviewed [Urgency] : urgency [Negative] : Heme/Lymph

## 2023-12-23 NOTE — PLAN
[FreeTextEntry1] : 72 y/o presents for annual visit.   #HM -Pap with HPV up to date, discussed discontinuation of paps after 65 given all normal -Mammo/sono requisition given -Colonoscopy up to date  -DEXA requisition provided   RTO 1 year or PRN juhi [facility]

## 2023-12-23 NOTE — HISTORY OF PRESENT ILLNESS
[FreeTextEntry1] : 74 y/o presents for annual visit. Pt reports urinary urgency overnight. Denies symptoms of COURTNEY Pt is s/p SHIRIN for painful periods "many years ago" Sexually active, denies pain/bleeding. Denies STI testing   HM  Pap NILM HPV neg 2021 Mammo/sono Oct 2022 Colonoscopy scheduled in 3 month  DEXA 2019 WNL per pt

## 2024-03-05 ENCOUNTER — APPOINTMENT (OUTPATIENT)
Dept: ULTRASOUND IMAGING | Facility: IMAGING CENTER | Age: 74
End: 2024-03-05
Payer: MEDICARE

## 2024-03-05 ENCOUNTER — RESULT REVIEW (OUTPATIENT)
Age: 74
End: 2024-03-05

## 2024-03-05 ENCOUNTER — OUTPATIENT (OUTPATIENT)
Dept: OUTPATIENT SERVICES | Facility: HOSPITAL | Age: 74
LOS: 1 days | End: 2024-03-05
Payer: MEDICARE

## 2024-03-05 ENCOUNTER — APPOINTMENT (OUTPATIENT)
Dept: RADIOLOGY | Facility: IMAGING CENTER | Age: 74
End: 2024-03-05
Payer: MEDICARE

## 2024-03-05 ENCOUNTER — APPOINTMENT (OUTPATIENT)
Dept: MAMMOGRAPHY | Facility: IMAGING CENTER | Age: 74
End: 2024-03-05
Payer: MEDICARE

## 2024-03-05 DIAGNOSIS — Z90.710 ACQUIRED ABSENCE OF BOTH CERVIX AND UTERUS: Chronic | ICD-10-CM

## 2024-03-05 DIAGNOSIS — Z01.419 ENCOUNTER FOR GYNECOLOGICAL EXAMINATION (GENERAL) (ROUTINE) WITHOUT ABNORMAL FINDINGS: ICD-10-CM

## 2024-03-05 PROCEDURE — 77067 SCR MAMMO BI INCL CAD: CPT | Mod: 26

## 2024-03-05 PROCEDURE — 77063 BREAST TOMOSYNTHESIS BI: CPT

## 2024-03-05 PROCEDURE — 77067 SCR MAMMO BI INCL CAD: CPT

## 2024-03-05 PROCEDURE — 77063 BREAST TOMOSYNTHESIS BI: CPT | Mod: 26

## 2024-03-05 PROCEDURE — 77085 DXA BONE DENSITY AXL VRT FX: CPT

## 2024-03-05 PROCEDURE — 76641 ULTRASOUND BREAST COMPLETE: CPT

## 2024-03-05 PROCEDURE — 77085 DXA BONE DENSITY AXL VRT FX: CPT | Mod: 26

## 2024-03-05 PROCEDURE — 76641 ULTRASOUND BREAST COMPLETE: CPT | Mod: 26,50,GY

## 2024-03-08 ENCOUNTER — RESULT REVIEW (OUTPATIENT)
Age: 74
End: 2024-03-08

## 2024-03-08 ENCOUNTER — APPOINTMENT (OUTPATIENT)
Dept: ULTRASOUND IMAGING | Facility: IMAGING CENTER | Age: 74
End: 2024-03-08
Payer: MEDICARE

## 2024-03-08 ENCOUNTER — OUTPATIENT (OUTPATIENT)
Dept: OUTPATIENT SERVICES | Facility: HOSPITAL | Age: 74
LOS: 1 days | End: 2024-03-08
Payer: MEDICARE

## 2024-03-08 DIAGNOSIS — Z00.8 ENCOUNTER FOR OTHER GENERAL EXAMINATION: ICD-10-CM

## 2024-03-08 DIAGNOSIS — Z90.710 ACQUIRED ABSENCE OF BOTH CERVIX AND UTERUS: Chronic | ICD-10-CM

## 2024-03-08 PROCEDURE — 76642 ULTRASOUND BREAST LIMITED: CPT

## 2024-03-08 PROCEDURE — 76641 ULTRASOUND BREAST COMPLETE: CPT

## 2024-03-08 PROCEDURE — 76642 ULTRASOUND BREAST LIMITED: CPT | Mod: 26,50

## 2024-03-12 DIAGNOSIS — N64.59 OTHER SIGNS AND SYMPTOMS IN BREAST: ICD-10-CM

## 2024-03-20 ENCOUNTER — RESULT REVIEW (OUTPATIENT)
Age: 74
End: 2024-03-20

## 2024-03-20 ENCOUNTER — APPOINTMENT (OUTPATIENT)
Dept: ULTRASOUND IMAGING | Facility: IMAGING CENTER | Age: 74
End: 2024-03-20
Payer: MEDICARE

## 2024-03-20 ENCOUNTER — OUTPATIENT (OUTPATIENT)
Dept: OUTPATIENT SERVICES | Facility: HOSPITAL | Age: 74
LOS: 1 days | End: 2024-03-20
Payer: MEDICARE

## 2024-03-20 DIAGNOSIS — Z90.710 ACQUIRED ABSENCE OF BOTH CERVIX AND UTERUS: Chronic | ICD-10-CM

## 2024-03-20 DIAGNOSIS — Z00.8 ENCOUNTER FOR OTHER GENERAL EXAMINATION: ICD-10-CM

## 2024-03-20 PROCEDURE — A4648: CPT

## 2024-03-20 PROCEDURE — 77065 DX MAMMO INCL CAD UNI: CPT

## 2024-03-20 PROCEDURE — 77065 DX MAMMO INCL CAD UNI: CPT | Mod: 26,LT

## 2024-03-20 PROCEDURE — 88305 TISSUE EXAM BY PATHOLOGIST: CPT

## 2024-03-20 PROCEDURE — 19083 BX BREAST 1ST LESION US IMAG: CPT | Mod: LT

## 2024-03-20 PROCEDURE — 19083 BX BREAST 1ST LESION US IMAG: CPT

## 2024-03-20 PROCEDURE — 88305 TISSUE EXAM BY PATHOLOGIST: CPT | Mod: 26

## 2024-03-24 ENCOUNTER — NON-APPOINTMENT (OUTPATIENT)
Age: 74
End: 2024-03-24

## 2024-03-25 LAB — SURGICAL PATHOLOGY STUDY: SIGNIFICANT CHANGE UP

## 2024-10-18 ENCOUNTER — APPOINTMENT (OUTPATIENT)
Dept: ULTRASOUND IMAGING | Facility: IMAGING CENTER | Age: 74
End: 2024-10-18
Payer: MEDICARE

## 2024-10-18 ENCOUNTER — RESULT REVIEW (OUTPATIENT)
Age: 74
End: 2024-10-18

## 2024-10-18 ENCOUNTER — OUTPATIENT (OUTPATIENT)
Dept: OUTPATIENT SERVICES | Facility: HOSPITAL | Age: 74
LOS: 1 days | End: 2024-10-18
Payer: MEDICARE

## 2024-10-18 DIAGNOSIS — Z90.710 ACQUIRED ABSENCE OF BOTH CERVIX AND UTERUS: Chronic | ICD-10-CM

## 2024-10-18 PROCEDURE — 76642 ULTRASOUND BREAST LIMITED: CPT | Mod: 26,RT

## 2024-11-20 PROCEDURE — 76642 ULTRASOUND BREAST LIMITED: CPT

## 2024-12-03 ENCOUNTER — APPOINTMENT (OUTPATIENT)
Dept: GASTROENTEROLOGY | Facility: AMBULATORY MEDICAL SERVICES | Age: 74
End: 2024-12-03
Payer: MEDICARE

## 2024-12-03 PROCEDURE — 45385 COLONOSCOPY W/LESION REMOVAL: CPT | Mod: PT

## 2024-12-03 PROCEDURE — 45380 COLONOSCOPY AND BIOPSY: CPT | Mod: PT,59

## 2024-12-23 ENCOUNTER — NON-APPOINTMENT (OUTPATIENT)
Age: 74
End: 2024-12-23

## 2025-04-10 ENCOUNTER — OUTPATIENT (OUTPATIENT)
Dept: OUTPATIENT SERVICES | Facility: HOSPITAL | Age: 75
LOS: 1 days | End: 2025-04-10
Payer: MEDICARE

## 2025-04-10 ENCOUNTER — APPOINTMENT (OUTPATIENT)
Dept: ULTRASOUND IMAGING | Facility: CLINIC | Age: 75
End: 2025-04-10
Payer: MEDICARE

## 2025-04-10 DIAGNOSIS — Z00.8 ENCOUNTER FOR OTHER GENERAL EXAMINATION: ICD-10-CM

## 2025-04-10 DIAGNOSIS — Z90.710 ACQUIRED ABSENCE OF BOTH CERVIX AND UTERUS: Chronic | ICD-10-CM

## 2025-04-10 PROCEDURE — 93970 EXTREMITY STUDY: CPT | Mod: 26

## 2025-04-10 PROCEDURE — 93970 EXTREMITY STUDY: CPT

## 2025-05-09 ENCOUNTER — APPOINTMENT (OUTPATIENT)
Dept: ULTRASOUND IMAGING | Facility: CLINIC | Age: 75
End: 2025-05-09

## 2025-05-09 ENCOUNTER — OUTPATIENT (OUTPATIENT)
Dept: OUTPATIENT SERVICES | Facility: HOSPITAL | Age: 75
LOS: 1 days | End: 2025-05-09
Payer: MEDICARE

## 2025-05-09 DIAGNOSIS — Z00.8 ENCOUNTER FOR OTHER GENERAL EXAMINATION: ICD-10-CM

## 2025-05-09 DIAGNOSIS — Z90.710 ACQUIRED ABSENCE OF BOTH CERVIX AND UTERUS: Chronic | ICD-10-CM

## 2025-05-09 PROCEDURE — 76705 ECHO EXAM OF ABDOMEN: CPT | Mod: 26

## 2025-05-09 PROCEDURE — 76705 ECHO EXAM OF ABDOMEN: CPT
